# Patient Record
Sex: FEMALE | Race: WHITE | URBAN - METROPOLITAN AREA
[De-identification: names, ages, dates, MRNs, and addresses within clinical notes are randomized per-mention and may not be internally consistent; named-entity substitution may affect disease eponyms.]

---

## 2017-10-31 ENCOUNTER — APPOINTMENT (RX ONLY)
Dept: URBAN - METROPOLITAN AREA CLINIC 161 | Facility: CLINIC | Age: 42
Setting detail: DERMATOLOGY
End: 2017-10-31

## 2017-10-31 DIAGNOSIS — L71.0 PERIORAL DERMATITIS: ICD-10-CM

## 2017-10-31 PROCEDURE — ? PATIENT SPECIFIC COUNSELING

## 2017-10-31 PROCEDURE — 99202 OFFICE O/P NEW SF 15 MIN: CPT

## 2017-10-31 PROCEDURE — ? PRESCRIPTION

## 2017-10-31 PROCEDURE — ? COUNSELING

## 2017-10-31 RX ORDER — CLINDAMYCIN PHOSPHATE 10 MG/ML
1 LOTION TOPICAL BID
Qty: 1 | Refills: 5 | Status: ERX | COMMUNITY
Start: 2017-10-31

## 2017-10-31 RX ORDER — DOXYCYCLINE HYCLATE 100 MG/1
1 CAPSULE, GELATIN COATED ORAL BID
Qty: 60 | Refills: 5 | Status: ERX | COMMUNITY
Start: 2017-10-31

## 2017-10-31 RX ADMIN — CLINDAMYCIN PHOSPHATE 1: 10 LOTION TOPICAL at 00:00

## 2017-10-31 RX ADMIN — DOXYCYCLINE HYCLATE 1: 100 CAPSULE, GELATIN COATED ORAL at 00:00

## 2017-10-31 ASSESSMENT — LOCATION SIMPLE DESCRIPTION DERM
LOCATION SIMPLE: LEFT EYELID
LOCATION SIMPLE: LEFT CHEEK
LOCATION SIMPLE: UPPER LIP
LOCATION SIMPLE: RIGHT CHEEK

## 2017-10-31 ASSESSMENT — LOCATION DETAILED DESCRIPTION DERM
LOCATION DETAILED: LEFT LATERAL CANTHUS
LOCATION DETAILED: RIGHT SUPERIOR CENTRAL MALAR CHEEK
LOCATION DETAILED: RIGHT INFERIOR MEDIAL MALAR CHEEK
LOCATION DETAILED: LEFT INFERIOR MEDIAL MALAR CHEEK
LOCATION DETAILED: PHILTRUM

## 2017-10-31 ASSESSMENT — LOCATION ZONE DERM
LOCATION ZONE: FACE
LOCATION ZONE: EYELID
LOCATION ZONE: LIP

## 2017-10-31 NOTE — PROCEDURE: PATIENT SPECIFIC COUNSELING
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  I recommend treatment with doxycycline hyclate 100 mg twice a day and clindamycin lotion twice a day.  RTC if condition does not improve/clear with treatment after 1-2 months of therapy.
Detail Level: Detailed

## 2018-01-16 ENCOUNTER — APPOINTMENT (RX ONLY)
Dept: URBAN - METROPOLITAN AREA CLINIC 161 | Facility: CLINIC | Age: 43
Setting detail: DERMATOLOGY
End: 2018-01-16

## 2018-01-16 DIAGNOSIS — D22 MELANOCYTIC NEVI: ICD-10-CM

## 2018-01-16 DIAGNOSIS — L44.8 OTHER SPECIFIED PAPULOSQUAMOUS DISORDERS: ICD-10-CM | Status: RESOLVING

## 2018-01-16 PROBLEM — D23.72 OTHER BENIGN NEOPLASM OF SKIN OF LEFT LOWER LIMB, INCLUDING HIP: Status: ACTIVE | Noted: 2018-01-16

## 2018-01-16 PROBLEM — F41.9 ANXIETY DISORDER, UNSPECIFIED: Status: ACTIVE | Noted: 2018-01-16

## 2018-01-16 PROBLEM — D22.61 MELANOCYTIC NEVI OF RIGHT UPPER LIMB, INCLUDING SHOULDER: Status: ACTIVE | Noted: 2018-01-16

## 2018-01-16 PROBLEM — D23.71 OTHER BENIGN NEOPLASM OF SKIN OF RIGHT LOWER LIMB, INCLUDING HIP: Status: ACTIVE | Noted: 2018-01-16

## 2018-01-16 PROBLEM — D22.39 MELANOCYTIC NEVI OF OTHER PARTS OF FACE: Status: ACTIVE | Noted: 2018-01-16

## 2018-01-16 PROBLEM — D22.5 MELANOCYTIC NEVI OF TRUNK: Status: ACTIVE | Noted: 2018-01-16

## 2018-01-16 PROCEDURE — ? COUNSELING

## 2018-01-16 PROCEDURE — 99213 OFFICE O/P EST LOW 20 MIN: CPT

## 2018-01-16 ASSESSMENT — LOCATION SIMPLE DESCRIPTION DERM
LOCATION SIMPLE: UPPER BACK
LOCATION SIMPLE: LEFT LOWER BACK
LOCATION SIMPLE: LEFT CLAVICULAR SKIN
LOCATION SIMPLE: RIGHT CHEEK
LOCATION SIMPLE: LEFT UPPER BACK
LOCATION SIMPLE: CHEST
LOCATION SIMPLE: RIGHT UPPER ARM

## 2018-01-16 ASSESSMENT — LOCATION DETAILED DESCRIPTION DERM
LOCATION DETAILED: SUPERIOR THORACIC SPINE
LOCATION DETAILED: LEFT SUPERIOR LATERAL MIDBACK
LOCATION DETAILED: RIGHT SUPERIOR CENTRAL BUCCAL CHEEK
LOCATION DETAILED: LEFT LATERAL SUPERIOR CHEST
LOCATION DETAILED: LEFT SUPERIOR UPPER BACK
LOCATION DETAILED: RIGHT ANTERIOR DISTAL UPPER ARM
LOCATION DETAILED: LEFT CLAVICULAR SKIN

## 2018-01-16 ASSESSMENT — LOCATION ZONE DERM
LOCATION ZONE: ARM
LOCATION ZONE: TRUNK
LOCATION ZONE: FACE

## 2018-11-08 ENCOUNTER — APPOINTMENT (RX ONLY)
Dept: URBAN - METROPOLITAN AREA CLINIC 161 | Facility: CLINIC | Age: 43
Setting detail: DERMATOLOGY
End: 2018-11-08

## 2018-11-08 DIAGNOSIS — H02.72 MADAROSIS OF EYELID AND PERIOCULAR AREA: ICD-10-CM

## 2018-11-08 DIAGNOSIS — K13.0 DISEASES OF LIPS: ICD-10-CM

## 2018-11-08 DIAGNOSIS — L21.8 OTHER SEBORRHEIC DERMATITIS: ICD-10-CM

## 2018-11-08 PROBLEM — H02.721 MADAROSIS OF RIGHT UPPER EYELID AND PERIOCULAR AREA: Status: ACTIVE | Noted: 2018-11-08

## 2018-11-08 PROBLEM — H02.724 MADAROSIS OF LEFT UPPER EYELID AND PERIOCULAR AREA: Status: ACTIVE | Noted: 2018-11-08

## 2018-11-08 PROBLEM — J45.909 UNSPECIFIED ASTHMA, UNCOMPLICATED: Status: ACTIVE | Noted: 2018-11-08

## 2018-11-08 PROCEDURE — ? COUNSELING

## 2018-11-08 PROCEDURE — ? PRESCRIPTION

## 2018-11-08 PROCEDURE — 99213 OFFICE O/P EST LOW 20 MIN: CPT

## 2018-11-08 PROCEDURE — ? PATIENT SPECIFIC COUNSELING

## 2018-11-08 RX ORDER — ALCLOMETASONE DIPROPIONATE 0.5 MG/G
1 CREAM TOPICAL QD
Qty: 1 | Refills: 3 | COMMUNITY
Start: 2018-11-08

## 2018-11-08 RX ORDER — BIMATOPROST 0.3 MG/ML
1 SOLUTION/ DROPS OPHTHALMIC QHS
Qty: 1 | Refills: 6 | COMMUNITY
Start: 2018-11-08

## 2018-11-08 RX ADMIN — BIMATOPROST 1: 0.3 SOLUTION/ DROPS OPHTHALMIC at 00:00

## 2018-11-08 RX ADMIN — ALCLOMETASONE DIPROPIONATE 1: 0.5 CREAM TOPICAL at 00:00

## 2018-11-08 ASSESSMENT — LOCATION ZONE DERM
LOCATION ZONE: FACE
LOCATION ZONE: EYELID
LOCATION ZONE: LIP

## 2018-11-08 ASSESSMENT — LOCATION DETAILED DESCRIPTION DERM
LOCATION DETAILED: LEFT SUPERIOR LID MARGIN
LOCATION DETAILED: RIGHT SUPERIOR LID MARGIN
LOCATION DETAILED: LEFT LATERAL EYEBROW
LOCATION DETAILED: RIGHT INFERIOR VERMILION LIP
LOCATION DETAILED: RIGHT SUPERIOR VERMILION LIP
LOCATION DETAILED: RIGHT LATERAL EYEBROW

## 2018-11-08 ASSESSMENT — LOCATION SIMPLE DESCRIPTION DERM
LOCATION SIMPLE: RIGHT LIP
LOCATION SIMPLE: RIGHT EYEBROW
LOCATION SIMPLE: LEFT SUPERIOR EYELID
LOCATION SIMPLE: LEFT EYEBROW
LOCATION SIMPLE: RIGHT SUPERIOR EYELID

## 2019-01-17 ENCOUNTER — NEW PATIENT (OUTPATIENT)
Dept: URBAN - METROPOLITAN AREA CLINIC 44 | Facility: CLINIC | Age: 44
End: 2019-01-17
Payer: OTHER GOVERNMENT

## 2019-01-17 DIAGNOSIS — H53.2 DIPLOPIA: Primary | ICD-10-CM

## 2019-01-17 PROCEDURE — 99203 OFFICE O/P NEW LOW 30 MIN: CPT | Performed by: OPHTHALMOLOGY

## 2019-01-17 PROCEDURE — 92285 EXTERNAL OCULAR PHOTOGRAPHY: CPT | Performed by: OPHTHALMOLOGY

## 2019-01-17 ASSESSMENT — INTRAOCULAR PRESSURE
OS: 15
OD: 12

## 2019-04-18 ENCOUNTER — FOLLOW UP ESTABLISHED (OUTPATIENT)
Dept: URBAN - METROPOLITAN AREA CLINIC 44 | Facility: CLINIC | Age: 44
End: 2019-04-18
Payer: OTHER GOVERNMENT

## 2019-04-18 PROCEDURE — 92285 EXTERNAL OCULAR PHOTOGRAPHY: CPT | Performed by: OPHTHALMOLOGY

## 2019-04-18 ASSESSMENT — INTRAOCULAR PRESSURE
OS: 8
OD: 8

## 2019-07-15 ENCOUNTER — APPOINTMENT (RX ONLY)
Dept: URBAN - METROPOLITAN AREA CLINIC 173 | Facility: CLINIC | Age: 44
Setting detail: DERMATOLOGY
End: 2019-07-15

## 2019-07-15 DIAGNOSIS — L57.8 OTHER SKIN CHANGES DUE TO CHRONIC EXPOSURE TO NONIONIZING RADIATION: ICD-10-CM

## 2019-07-15 DIAGNOSIS — L98.8 OTHER SPECIFIED DISORDERS OF THE SKIN AND SUBCUTANEOUS TISSUE: ICD-10-CM

## 2019-07-15 PROCEDURE — ? PATIENT SPECIFIC COUNSELING

## 2019-07-15 PROCEDURE — 99212 OFFICE O/P EST SF 10 MIN: CPT

## 2019-07-15 PROCEDURE — ? COUNSELING

## 2019-07-15 PROCEDURE — ? PRESCRIPTION

## 2019-07-15 RX ORDER — TRETINOIN 0.5 MG/G
1 CREAM TOPICAL QD
Qty: 1 | Refills: 12 | COMMUNITY
Start: 2019-07-15

## 2019-07-15 RX ADMIN — TRETINOIN 1: 0.5 CREAM TOPICAL at 00:00

## 2019-07-15 ASSESSMENT — LOCATION SIMPLE DESCRIPTION DERM
LOCATION SIMPLE: RIGHT CHEEK
LOCATION SIMPLE: LEFT CHEEK

## 2019-07-15 ASSESSMENT — LOCATION DETAILED DESCRIPTION DERM
LOCATION DETAILED: LEFT SUPERIOR MEDIAL BUCCAL CHEEK
LOCATION DETAILED: RIGHT SUPERIOR MEDIAL BUCCAL CHEEK
LOCATION DETAILED: LEFT INFERIOR MEDIAL MALAR CHEEK
LOCATION DETAILED: RIGHT INFERIOR CENTRAL MALAR CHEEK

## 2019-07-15 ASSESSMENT — LOCATION ZONE DERM: LOCATION ZONE: FACE

## 2019-07-15 NOTE — PROCEDURE: PATIENT SPECIFIC COUNSELING
I discussed with patient the the \"furrows\" on her cheek are superficial rhytides.  Printed prescription given for tretinoin 0.05% cream along with [employer] rebate.
Detail Level: Simple

## 2020-02-25 ENCOUNTER — APPOINTMENT (RX ONLY)
Dept: URBAN - METROPOLITAN AREA CLINIC 158 | Facility: CLINIC | Age: 45
Setting detail: DERMATOLOGY
End: 2020-02-25

## 2020-02-25 DIAGNOSIS — K13.0 DISEASES OF LIPS: ICD-10-CM | Status: INADEQUATELY CONTROLLED

## 2020-02-25 PROCEDURE — ? PATIENT SPECIFIC COUNSELING

## 2020-02-25 PROCEDURE — ? EDUCATIONAL RESOURCES PROVIDED

## 2020-02-25 PROCEDURE — ? PRESCRIPTION

## 2020-02-25 PROCEDURE — ? COUNSELING

## 2020-02-25 PROCEDURE — 99213 OFFICE O/P EST LOW 20 MIN: CPT

## 2020-02-25 RX ORDER — NYSTATIN AND TRIAMCINOLONE ACETONIDE 100000; 1 [USP'U]/G; MG/G
1 OINTMENT TOPICAL TWICE A DAY
Qty: 1 | Refills: 2 | Status: ERX | COMMUNITY
Start: 2020-02-25

## 2020-02-25 RX ADMIN — NYSTATIN AND TRIAMCINOLONE ACETONIDE 1: 100000; 1 OINTMENT TOPICAL at 00:00

## 2020-02-25 ASSESSMENT — LOCATION DETAILED DESCRIPTION DERM
LOCATION DETAILED: LEFT ORAL COMMISSURE
LOCATION DETAILED: RIGHT ORAL COMMISSURE

## 2020-02-25 ASSESSMENT — LOCATION ZONE DERM: LOCATION ZONE: LIP

## 2020-02-25 ASSESSMENT — SEVERITY ASSESSMENT: SEVERITY: MILD TO MODERATE

## 2020-02-25 ASSESSMENT — LOCATION SIMPLE DESCRIPTION DERM
LOCATION SIMPLE: LEFT LIP
LOCATION SIMPLE: RIGHT LIP

## 2020-02-25 NOTE — PROCEDURE: PATIENT SPECIFIC COUNSELING
Patient instructed to apply barrier creams (such as Aquaphor ointment) and  avoid licking lips excessively.
Detail Level: Detailed

## 2021-06-08 ENCOUNTER — APPOINTMENT (RX ONLY)
Dept: URBAN - METROPOLITAN AREA CLINIC 158 | Facility: CLINIC | Age: 46
Setting detail: DERMATOLOGY
End: 2021-06-08

## 2021-06-08 DIAGNOSIS — H02.72 MADAROSIS OF EYELID AND PERIOCULAR AREA: ICD-10-CM

## 2021-06-08 PROBLEM — H02.724 MADAROSIS OF LEFT UPPER EYELID AND PERIOCULAR AREA: Status: ACTIVE | Noted: 2021-06-08

## 2021-06-08 PROBLEM — H02.721 MADAROSIS OF RIGHT UPPER EYELID AND PERIOCULAR AREA: Status: ACTIVE | Noted: 2021-06-08

## 2021-06-08 PROCEDURE — ? COUNSELING

## 2021-06-08 PROCEDURE — ? EDUCATIONAL RESOURCES PROVIDED

## 2021-06-08 PROCEDURE — ? PRESCRIPTION

## 2021-06-08 PROCEDURE — ? PATIENT SPECIFIC COUNSELING

## 2021-06-08 PROCEDURE — 99212 OFFICE O/P EST SF 10 MIN: CPT

## 2021-06-08 RX ORDER — BIMATOPROST 0.3 MG/ML
1 SOLUTION/ DROPS OPHTHALMIC QD
Qty: 1 | Refills: 12 | Status: ERX | COMMUNITY
Start: 2021-06-08

## 2021-06-08 RX ADMIN — BIMATOPROST 1: 0.3 SOLUTION/ DROPS OPHTHALMIC at 00:00

## 2021-06-08 ASSESSMENT — LOCATION SIMPLE DESCRIPTION DERM
LOCATION SIMPLE: RIGHT SUPERIOR EYELID
LOCATION SIMPLE: LEFT SUPERIOR EYELID

## 2021-06-08 ASSESSMENT — LOCATION DETAILED DESCRIPTION DERM
LOCATION DETAILED: LEFT SUPERIOR LID MARGIN
LOCATION DETAILED: RIGHT SUPERIOR LID MARGIN

## 2021-06-08 ASSESSMENT — LOCATION ZONE DERM: LOCATION ZONE: EYELID

## 2022-03-09 ENCOUNTER — APPOINTMENT (RX ONLY)
Dept: URBAN - METROPOLITAN AREA CLINIC 158 | Facility: CLINIC | Age: 47
Setting detail: DERMATOLOGY
End: 2022-03-09

## 2022-03-09 DIAGNOSIS — L30.9 DERMATITIS, UNSPECIFIED: ICD-10-CM

## 2022-03-09 DIAGNOSIS — L85.3 XEROSIS CUTIS: ICD-10-CM

## 2022-03-09 PROCEDURE — ? OTHER

## 2022-03-09 PROCEDURE — 99213 OFFICE O/P EST LOW 20 MIN: CPT

## 2022-03-09 PROCEDURE — ? PRESCRIPTION

## 2022-03-09 PROCEDURE — ? COUNSELING

## 2022-03-09 RX ORDER — HYDROCORTISONE 25 MG/G
1 OINTMENT TOPICAL TWICE A DAY
Qty: 20 | Refills: 0 | Status: ERX | COMMUNITY
Start: 2022-03-09

## 2022-03-09 RX ADMIN — HYDROCORTISONE 1: 25 OINTMENT TOPICAL at 00:00

## 2022-03-09 ASSESSMENT — LOCATION ZONE DERM: LOCATION ZONE: LIP

## 2022-03-09 ASSESSMENT — LOCATION DETAILED DESCRIPTION DERM
LOCATION DETAILED: LEFT SUPERIOR VERMILION LIP
LOCATION DETAILED: RIGHT INFERIOR VERMILION LIP

## 2022-03-09 ASSESSMENT — LOCATION SIMPLE DESCRIPTION DERM
LOCATION SIMPLE: LEFT LIP
LOCATION SIMPLE: RIGHT LIP

## 2022-03-09 NOTE — PROCEDURE: OTHER
Detail Level: Zone
Note Text (......Xxx Chief Complaint.): This diagnosis correlates with the
Render Risk Assessment In Note?: no
Other (Free Text): Recommended to d/c all lip products and only use Aquaphor
Other (Free Text): Patient denies lip licking, new cosmetics, or medications. Lips are very dry. Lesion is eczematous and erythematous.

## 2022-03-28 ENCOUNTER — OFFICE VISIT (OUTPATIENT)
Dept: URBAN - METROPOLITAN AREA CLINIC 51 | Facility: CLINIC | Age: 47
End: 2022-03-28
Payer: OTHER GOVERNMENT

## 2022-03-28 DIAGNOSIS — R73.09 OTHER ABNORMAL GLUCOSE: Primary | ICD-10-CM

## 2022-03-28 DIAGNOSIS — H52.4 PRESBYOPIA: ICD-10-CM

## 2022-03-28 DIAGNOSIS — H04.123 TEAR FILM INSUFFICIENCY OF BILATERAL LACRIMAL GLANDS: ICD-10-CM

## 2022-03-28 DIAGNOSIS — Z98.890 OTHER SPECIFIED POSTPROCEDURAL STATES: ICD-10-CM

## 2022-03-28 DIAGNOSIS — H43.313 VITREOUS MEMBRANES AND STRANDS, BILATERAL: ICD-10-CM

## 2022-03-28 PROCEDURE — 92004 COMPRE OPH EXAM NEW PT 1/>: CPT | Performed by: OPTOMETRIST

## 2022-03-28 PROCEDURE — 92134 CPTRZ OPH DX IMG PST SGM RTA: CPT | Performed by: OPTOMETRIST

## 2022-03-28 ASSESSMENT — VISUAL ACUITY
OS: 20/25
OD: 20/20

## 2022-03-28 ASSESSMENT — KERATOMETRY
OS: 39.72
OD: 39.11

## 2022-03-28 ASSESSMENT — INTRAOCULAR PRESSURE
OS: 12
OD: 10

## 2022-03-28 NOTE — IMPRESSION/PLAN
Impression: Other specified postprocedural states: Z98.890.
~done elsewhere in 2000 Plan: s/p LASIK OU. Status quo OU. Monitor.

## 2022-03-28 NOTE — IMPRESSION/PLAN
Impression: Other abnormal glucose: R73.09.
*Reports taking Metformin for POS Plan: No Background Diabetic Retinopathy, no Diabetic Macular Edema and no Neovascularization of the iris, disc, or elsewhere. Disc. ocular and systemic benefits of blood sugar control. The American Diabetes Association recommends target glycohemoglobin at 7.0% or less to minimize incidence of retinopathy as well as other systemic complications of diabetes mellitus. Send notes to PCP. Check annually.

## 2022-03-28 NOTE — IMPRESSION/PLAN
Impression: Tear film insufficiency of bilateral lacrimal glands: H04.123. *s/p LASIK OU. *incomplete blinks OU
*Pt uses Naphcon A for allergies and redness Plan: Recommend patient to use ATs QID or more OU (from a list of recommended brands of artificial tears, dispensed list for patient to review) Blinking exercises reviewed

## 2022-05-19 ENCOUNTER — APPOINTMENT (RX ONLY)
Dept: URBAN - METROPOLITAN AREA CLINIC 158 | Facility: CLINIC | Age: 47
Setting detail: DERMATOLOGY
End: 2022-05-19

## 2022-05-19 DIAGNOSIS — H02.72 MADAROSIS OF EYELID AND PERIOCULAR AREA: ICD-10-CM

## 2022-05-19 DIAGNOSIS — K13.0 DISEASES OF LIPS: ICD-10-CM

## 2022-05-19 PROBLEM — H02.721 MADAROSIS OF RIGHT UPPER EYELID AND PERIOCULAR AREA: Status: ACTIVE | Noted: 2022-05-19

## 2022-05-19 PROBLEM — H02.724 MADAROSIS OF LEFT UPPER EYELID AND PERIOCULAR AREA: Status: ACTIVE | Noted: 2022-05-19

## 2022-05-19 PROCEDURE — ? COUNSELING

## 2022-05-19 PROCEDURE — ? PRESCRIPTION

## 2022-05-19 PROCEDURE — 99213 OFFICE O/P EST LOW 20 MIN: CPT

## 2022-05-19 PROCEDURE — ? PATIENT SPECIFIC COUNSELING

## 2022-05-19 RX ORDER — EMOLLIENT COMBINATION NO.32
1 EMULSION, EXTENDED RELEASE TOPICAL QD
Qty: 225 | Refills: 12 | Status: ERX | COMMUNITY
Start: 2022-05-19

## 2022-05-19 RX ORDER — BIMATOPROST 0.3 MG/ML
1 SOLUTION/ DROPS OPHTHALMIC QD
Qty: 5 | Refills: 12

## 2022-05-19 RX ADMIN — Medication 1: at 00:00

## 2022-05-19 ASSESSMENT — LOCATION DETAILED DESCRIPTION DERM
LOCATION DETAILED: RIGHT INFERIOR VERMILION LIP
LOCATION DETAILED: RIGHT MEDIAL MALAR CHEEK
LOCATION DETAILED: LEFT INFERIOR VERMILION LIP
LOCATION DETAILED: LEFT SUPERIOR LID MARGIN
LOCATION DETAILED: LEFT MEDIAL MALAR CHEEK
LOCATION DETAILED: RIGHT SUPERIOR LID MARGIN
LOCATION DETAILED: RIGHT SUPERIOR VERMILION LIP
LOCATION DETAILED: LEFT SUPERIOR VERMILION LIP

## 2022-05-19 ASSESSMENT — LOCATION ZONE DERM
LOCATION ZONE: LIP
LOCATION ZONE: EYELID
LOCATION ZONE: FACE

## 2022-05-19 ASSESSMENT — LOCATION SIMPLE DESCRIPTION DERM
LOCATION SIMPLE: RIGHT CHEEK
LOCATION SIMPLE: LEFT LIP
LOCATION SIMPLE: RIGHT LIP
LOCATION SIMPLE: RIGHT SUPERIOR EYELID
LOCATION SIMPLE: LEFT SUPERIOR EYELID
LOCATION SIMPLE: LEFT CHEEK

## 2022-05-19 NOTE — PROCEDURE: PATIENT SPECIFIC COUNSELING
Detail Level: Detailed
Patient request refill on generic Latisse. Printed prescription given.
Detail Level: Simple
Patient ahs persistent dry skin on the face and chapped lip despite the use of Aquaphor ointment.  I recommend treatment with Epiceram emulsion.  Will send prescription to [facility] to get $20 pricing.

## 2022-10-11 ENCOUNTER — APPOINTMENT (RX ONLY)
Dept: URBAN - METROPOLITAN AREA CLINIC 158 | Facility: CLINIC | Age: 47
Setting detail: DERMATOLOGY
End: 2022-10-11

## 2022-10-11 DIAGNOSIS — L57.0 ACTINIC KERATOSIS: ICD-10-CM

## 2022-10-11 PROCEDURE — ? LIQUID NITROGEN

## 2022-10-11 PROCEDURE — 17000 DESTRUCT PREMALG LESION: CPT

## 2022-10-11 PROCEDURE — ? EDUCATIONAL RESOURCES PROVIDED

## 2022-10-11 ASSESSMENT — LOCATION ZONE DERM: LOCATION ZONE: TRUNK

## 2022-10-11 ASSESSMENT — LOCATION SIMPLE DESCRIPTION DERM: LOCATION SIMPLE: CHEST

## 2022-10-11 ASSESSMENT — LOCATION DETAILED DESCRIPTION DERM: LOCATION DETAILED: RIGHT LATERAL SUPERIOR CHEST

## 2022-10-11 NOTE — PROCEDURE: LIQUID NITROGEN
Consent: The patient's consent was obtained including but not limited to risks of crusting, scabbing, blistering, scarring, darker or lighter pigmentary change, recurrence, incomplete removal and infection.
Detail Level: Detailed
Number Of Freeze-Thaw Cycles: 1 freeze-thaw cycle
Render Note In Bullet Format When Appropriate: No
Show Applicator Variable?: Yes
Duration Of Freeze Thaw-Cycle (Seconds): 5
Post-Care Instructions: I reviewed with the patient in detail post-care instructions. Patient is to wear sunprotection, and avoid picking at any of the treated lesions. Pt may apply Vaseline to crusted or scabbing areas.  Patient made aware that some lesions may take more than one treatment to fully resolve and some lesion may recur.  Patient was instructed to return to the office if lesions are not resolved after 2 months.

## 2023-05-12 ENCOUNTER — OFFICE VISIT (OUTPATIENT)
Dept: URBAN - METROPOLITAN AREA CLINIC 51 | Facility: CLINIC | Age: 48
End: 2023-05-12
Payer: OTHER GOVERNMENT

## 2023-05-12 DIAGNOSIS — H52.4 PRESBYOPIA: ICD-10-CM

## 2023-05-12 DIAGNOSIS — H43.313 VITREOUS MEMBRANES AND STRANDS, BILATERAL: Primary | ICD-10-CM

## 2023-05-12 DIAGNOSIS — H04.123 TEAR FILM INSUFFICIENCY OF BILATERAL LACRIMAL GLANDS: ICD-10-CM

## 2023-05-12 DIAGNOSIS — H16.422 PANNUS (CORNEAL), LEFT EYE: ICD-10-CM

## 2023-05-12 DIAGNOSIS — Z98.890 OTHER SPECIFIED POSTPROCEDURAL STATES: ICD-10-CM

## 2023-05-12 PROCEDURE — 99214 OFFICE O/P EST MOD 30 MIN: CPT | Performed by: OPTOMETRIST

## 2023-05-12 PROCEDURE — 92134 CPTRZ OPH DX IMG PST SGM RTA: CPT | Performed by: OPTOMETRIST

## 2023-05-12 ASSESSMENT — KERATOMETRY
OD: 39.25
OS: 39.88

## 2023-05-12 ASSESSMENT — INTRAOCULAR PRESSURE
OD: 11
OS: 13

## 2023-05-12 ASSESSMENT — VISUAL ACUITY
OD: 20/20
OS: 20/25

## 2023-05-12 NOTE — IMPRESSION/PLAN
Impression: Tear film insufficiency of bilateral lacrimal glands: H04.123. *s/p LASIK OU. *incomplete blinks OU
*Pt uses Naphcon A for allergies and redness Plan: Patient reports longstanding history of dryness. Recommend the followin) AT's at least 2+ times per day or more 2) START gel or ointment in the evenings before going to bed - list of brands given to patient 3) Avoid direct air flow from ceiling fans

## 2023-05-12 NOTE — IMPRESSION/PLAN
Impression: Presbyopia: H52.4. Plan: Demonstrated and reviewed today's refraction with patient. Release new SRx for optimal vision update.

## 2023-05-12 NOTE — IMPRESSION/PLAN
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 Plan: Educated patient on today's findings. Discussed possible etiologies (dryness vs. trauma). Recommend AT's at least 2+ times per day or more.

## 2023-05-12 NOTE — IMPRESSION/PLAN
Impression: Other specified postprocedural states: Z98.890.
~done elsewhere in 2000 Plan: History of LASIK. Flaps clear and centered OU.

## 2024-01-30 ENCOUNTER — APPOINTMENT (RX ONLY)
Dept: URBAN - METROPOLITAN AREA CLINIC 158 | Facility: CLINIC | Age: 49
Setting detail: DERMATOLOGY
End: 2024-01-30

## 2024-01-30 DIAGNOSIS — H02.72 MADAROSIS OF EYELID AND PERIOCULAR AREA: ICD-10-CM

## 2024-01-30 DIAGNOSIS — D22 MELANOCYTIC NEVI: ICD-10-CM

## 2024-01-30 PROBLEM — D23.71 OTHER BENIGN NEOPLASM OF SKIN OF RIGHT LOWER LIMB, INCLUDING HIP: Status: ACTIVE | Noted: 2024-01-30

## 2024-01-30 PROBLEM — D22.61 MELANOCYTIC NEVI OF RIGHT UPPER LIMB, INCLUDING SHOULDER: Status: ACTIVE | Noted: 2024-01-30

## 2024-01-30 PROBLEM — D22.5 MELANOCYTIC NEVI OF TRUNK: Status: ACTIVE | Noted: 2024-01-30

## 2024-01-30 PROBLEM — D22.62 MELANOCYTIC NEVI OF LEFT UPPER LIMB, INCLUDING SHOULDER: Status: ACTIVE | Noted: 2024-01-30

## 2024-01-30 PROBLEM — H02.721 MADAROSIS OF RIGHT UPPER EYELID AND PERIOCULAR AREA: Status: ACTIVE | Noted: 2024-01-30

## 2024-01-30 PROBLEM — D22.39 MELANOCYTIC NEVI OF OTHER PARTS OF FACE: Status: ACTIVE | Noted: 2024-01-30

## 2024-01-30 PROBLEM — H02.724 MADAROSIS OF LEFT UPPER EYELID AND PERIOCULAR AREA: Status: ACTIVE | Noted: 2024-01-30

## 2024-01-30 PROCEDURE — ? PATIENT SPECIFIC COUNSELING

## 2024-01-30 PROCEDURE — ? COUNSELING

## 2024-01-30 PROCEDURE — ? EDUCATIONAL RESOURCES PROVIDED

## 2024-01-30 PROCEDURE — 99213 OFFICE O/P EST LOW 20 MIN: CPT

## 2024-01-30 PROCEDURE — ? PRESCRIPTION

## 2024-01-30 RX ORDER — BIMATOPROST 0.3 MG/ML
1 SOLUTION/ DROPS OPHTHALMIC QD
Qty: 5 | Refills: 12 | Status: ERX | COMMUNITY
Start: 2024-01-30

## 2024-01-30 RX ADMIN — BIMATOPROST 1: 0.3 SOLUTION/ DROPS OPHTHALMIC at 00:00

## 2024-01-30 ASSESSMENT — LOCATION SIMPLE DESCRIPTION DERM
LOCATION SIMPLE: LEFT SHOULDER
LOCATION SIMPLE: RIGHT SUPERIOR EYELID
LOCATION SIMPLE: LEFT SUPERIOR EYELID
LOCATION SIMPLE: ABDOMEN
LOCATION SIMPLE: RIGHT CHEEK
LOCATION SIMPLE: LEFT UPPER BACK
LOCATION SIMPLE: RIGHT UPPER ARM
LOCATION SIMPLE: UPPER BACK
LOCATION SIMPLE: LEFT CLAVICULAR SKIN
LOCATION SIMPLE: LEFT LOWER BACK
LOCATION SIMPLE: LEFT POSTERIOR UPPER ARM

## 2024-01-30 ASSESSMENT — LOCATION DETAILED DESCRIPTION DERM
LOCATION DETAILED: RIGHT SUPERIOR LID MARGIN
LOCATION DETAILED: LEFT SUPERIOR UPPER BACK
LOCATION DETAILED: LEFT ANTERIOR SHOULDER
LOCATION DETAILED: LEFT POSTERIOR SHOULDER
LOCATION DETAILED: LEFT SUPERIOR LID MARGIN
LOCATION DETAILED: RIGHT SUPERIOR CENTRAL BUCCAL CHEEK
LOCATION DETAILED: LEFT PROXIMAL POSTERIOR UPPER ARM
LOCATION DETAILED: RIGHT ANTERIOR DISTAL UPPER ARM
LOCATION DETAILED: LEFT CLAVICULAR SKIN
LOCATION DETAILED: SUPERIOR THORACIC SPINE
LOCATION DETAILED: LEFT SUPERIOR LATERAL MIDBACK
LOCATION DETAILED: RIGHT LATERAL ABDOMEN

## 2024-01-30 ASSESSMENT — LOCATION ZONE DERM
LOCATION ZONE: FACE
LOCATION ZONE: EYELID
LOCATION ZONE: ARM
LOCATION ZONE: TRUNK

## 2024-01-30 NOTE — PROCEDURE: PATIENT SPECIFIC COUNSELING
Detail Level: Detailed
Patient request refill on generic Latisse. Prescription sent electronically to [facility]. Goodrx rebate to [facility] printed for patient.

## 2024-03-04 ENCOUNTER — APPOINTMENT (RX ONLY)
Dept: URBAN - METROPOLITAN AREA CLINIC 158 | Facility: CLINIC | Age: 49
Setting detail: DERMATOLOGY
End: 2024-03-04

## 2024-03-04 DIAGNOSIS — L259 CONTACT DERMATITIS AND OTHER ECZEMA, UNSPECIFIED CAUSE: ICD-10-CM

## 2024-03-04 PROBLEM — L23.9 ALLERGIC CONTACT DERMATITIS, UNSPECIFIED CAUSE: Status: ACTIVE | Noted: 2024-03-04

## 2024-03-04 PROCEDURE — ? PRESCRIPTION

## 2024-03-04 PROCEDURE — ? COUNSELING

## 2024-03-04 PROCEDURE — 99213 OFFICE O/P EST LOW 20 MIN: CPT

## 2024-03-04 PROCEDURE — ? OTHER

## 2024-03-04 RX ORDER — CLOBETASOL PROPIONATE 0.5 MG/ML
1 SOLUTION TOPICAL BID
Qty: 50 | Refills: 1 | Status: ERX | COMMUNITY
Start: 2024-03-04

## 2024-03-04 RX ADMIN — CLOBETASOL PROPIONATE 1: 0.5 SOLUTION TOPICAL at 00:00

## 2024-03-05 NOTE — PROCEDURE: OTHER
Medication:   metFORMIN (GLUCOPHAGE) 500 MG tablet -- --  --    Sig - Route: Take 1,000 mg by mouth 2 times daily (with meals). - Oral      amLODIPine (NORVASC) 5 MG tablet -- --  --    Sig - Route: Take 5 mg by mouth daily. - Oral      Last office visit date: 1/11/24  Medication Refill Protocol Failed.  Failed criteria: see below. Sent to clinician to review.       Metformin Antihyperglycemics Refill Protocol - 12 Month Protcol [id number]/05/2024 08:26 AM   Protocol Details eGFR resulted within last 12 months -- IF CRITERIA FAILED REFER TO PROTOCOL DETAILS    eGFR greater than 59 within last 12 months looking at last value OR If eGFR is between 45-59 then has patient had a repeat eGFR resulted in past 6 months -- IF CRITERIA FAILED REFER TO PROTOCOL DETAILS    Seen by prescribing provider or same department within the last 12 months or has a future appt in 3 months - IF FAILED PLEASE LOOK AT CHART REVIEW FOR LAST VISIT AND PROCEED ACCORDINGLY    Lipid Panel resulted within last 15 months    Hgb A1c less than 8 within last 6 months looking at last value -- IF CRITERIA FAILED REFER TO PROTOCOL DETAILS    Medication (including dose and sig) on current meds list          Calcium Channel Blockers Refill Protocol - 12 Month Protocol [id number]/05/2024 08:26 AM   Protocol Details eGFR within last 12 months looking at last value    Last BP was under 140/90 or if patient has diabetes, CAD, or PVD, BP under 130/80 in past year -- IF CRITERIA FAILED REFER TO PROTOCOL DETAILS    Seen by prescribing provider or same department within the last 12 months or has a future appt in 3 months - IF FAILED PLEASE LOOK AT CHART REVIEW FOR LAST VISIT AND PROCEED ACCORDINGLY    Last recorded pulse is greater than 49    Medication (including dose and sig) on current meds list        
Other (Free Text): 03/04/2024- Secondary to hair color and keratin treatment. 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 Szymanski to initiate patch testing. In the meantime, will prescribe Clobetasol 0.05% scalp solution. Follow up as needed.
Detail Level: Zone
Note Text (......Xxx Chief Complaint.): This diagnosis correlates with the
Render Risk Assessment In Note?: no

## 2024-03-14 ENCOUNTER — APPOINTMENT (RX ONLY)
Dept: URBAN - METROPOLITAN AREA CLINIC 158 | Facility: CLINIC | Age: 49
Setting detail: DERMATOLOGY
End: 2024-03-14

## 2024-03-14 DIAGNOSIS — L259 CONTACT DERMATITIS AND OTHER ECZEMA, UNSPECIFIED CAUSE: ICD-10-CM

## 2024-03-14 PROBLEM — L30.9 DERMATITIS, UNSPECIFIED: Status: ACTIVE | Noted: 2024-03-14

## 2024-03-14 PROCEDURE — ? COUNSELING

## 2024-03-14 PROCEDURE — ? PATIENT SPECIFIC COUNSELING

## 2024-03-14 PROCEDURE — 99213 OFFICE O/P EST LOW 20 MIN: CPT

## 2024-03-14 PROCEDURE — ? DEFER

## 2024-03-14 NOTE — PROCEDURE: DEFER
Procedure To Be Performed At Next Visit: Patch testing
X Size Of Lesion In Cm (Optional): 0
Introduction Text (Please End With A Colon): :
Detail Level: Detailed

## 2024-03-14 NOTE — PROCEDURE: PATIENT SPECIFIC COUNSELING
Detail Level: Detailed
Hx:  was seen in same hair salon as usual, same stylist and same products were used (Hair color dye touch up of root, followed by Trisolla keratin treatment***.\\nDuring the keratin Tx, she had a moderately intense tingling of the crown of the scalp, different than usual mild tingling with past txs.\\n1 week later she had crusting diffusely over the scalp.\\nShe saw Jess Raul who Rxed clobetasol scalp solution and recommended further evaluation for allergic contact dermatitis.\\n\\nOther Hx: has had what sounds like both contact urticaria (immediate reaction with hives surrounding her mouth after application of a  lip plumper) and allergic contact dermatitis (delayed hypersensitivity reaction) to skin care products.\\n\\nToday we discussed the use of patch testing to detect DHS/ACD.\\nShe understands and would like to go forth with this testing.\\n\\nI suspect she will react to formaldehyde and formaldehyde releasing agents, but alternatively she may instead react to PPD as found in the hair dye. Finally, it is possible that she will react to none of these, leaving us with the most likely diagnosis of severe irritant contact dermatitis from the keratin treatment.\\n\\n\\n***\\nFrom [facility] website:\\n\\n\"True Reconstructing Keratin Treatments release the lowest amount of \\nmethylene glycol (formaldehyde) \\non the market. Also known as (PPM) parts per million. In turn, our treatments deliver a comfortable experience for the professionals and their clients!\\n\\nINGREDIENTS:\\nBrazilian Marine Algae , Hypnea Musciformis Extract, Powerful Antioxidants, Deeply moisturizing for softness, Gellidiela Acerosa Extract, Rich in amino acids to restructure hair, soothes + smooths the hair.\"

## 2024-04-01 ENCOUNTER — APPOINTMENT (RX ONLY)
Dept: URBAN - METROPOLITAN AREA CLINIC 158 | Facility: CLINIC | Age: 49
Setting detail: DERMATOLOGY
End: 2024-04-01

## 2024-04-01 DIAGNOSIS — L259 CONTACT DERMATITIS AND OTHER ECZEMA, UNSPECIFIED CAUSE: ICD-10-CM

## 2024-04-01 PROBLEM — L23.9 ALLERGIC CONTACT DERMATITIS, UNSPECIFIED CAUSE: Status: ACTIVE | Noted: 2024-04-01

## 2024-04-01 PROCEDURE — ? PATCH TESTING

## 2024-04-01 PROCEDURE — 95044 PATCH/APPLICATION TESTS: CPT

## 2024-04-01 NOTE — PROCEDURE: PATCH TESTING
Consent: Verbal consent obtained, risks reviewed including but not limited to rash, itching, allergic reaction rash.
Detail Level: Zone
Post-Care Instructions: I reviewed with the patient in detail post-care instructions. Patient should not sweat, pick at, or get the patches wet for 48 hours.
Number Of Patches (Maximum Allowable Per Dos By Cms Is 90): 80

## 2024-04-03 ENCOUNTER — APPOINTMENT (RX ONLY)
Dept: URBAN - METROPOLITAN AREA CLINIC 158 | Facility: CLINIC | Age: 49
Setting detail: DERMATOLOGY
End: 2024-04-03

## 2024-04-03 DIAGNOSIS — L259 CONTACT DERMATITIS AND OTHER ECZEMA, UNSPECIFIED CAUSE: ICD-10-CM

## 2024-04-03 PROBLEM — L23.9 ALLERGIC CONTACT DERMATITIS, UNSPECIFIED CAUSE: Status: ACTIVE | Noted: 2024-04-03

## 2024-04-03 PROCEDURE — ? NORTH AMERICAN 80 PATCH TEST READING

## 2024-04-03 NOTE — PROCEDURE: NORTH AMERICAN 80 PATCH TEST READING
Allergen 31 Reaction: no reaction
Name Of Allergen 11: Clobetasol-17-propionate
Name Of Allergen 65: Disperse Blue 106/124 Mix
Name Of Allergen 70: Benzoylperoxide
Name Of Allergen 75: Amidoamine
Name Of Allergen 50: Fragrance Mix ll
Name Of Allergen 60: Hydroxyperoxides of Limonene
Name Of Allergen 55: Hema 2-Hydroxyethyl methacrylate
Show Allergen Counseling In The Note?: No
Name Of Allergen 6: Cinnamal / (Cinnamic aldehyde)
Name Of Allergen 22: DL-alpha-tocopherol
Name Of Allergen 16: Mercapto mix
Name Of Allergen 80: Oleamidopropyl dimethylamine
Name Of Allergen 1: Benzocaine
Name Of Allergen 42: Methyl methacrylate
Name Of Allergen 37: 8-skmh-Tvxbk-4-methoxyphenol (BHA)
Name Of Allergen 32: Thimerosal (Merthiolate)
Name Of Allergen 27: Methyldibromoglutaronitrile (MDBGN)
Name Of Allergen 51: Disperse Yellow 3
Name Of Allergen 76: Cocamidopropylbetaine
Name Of Allergen 56: DMDM Hydantoin
Name Of Allergen 61: Desoximethasone
Name Of Allergen 7: Amerchol L 101
Name Of Allergen 12: Ethylenediamine dihydrochloride
Name Of Allergen 66: Compositae Mix ll
Name Of Allergen 71: Isoamyl-p-methoxycinnamate
Name Of Allergen 33: Propolis
Name Of Allergen 17: N,N- Diphenylguanidine
Name Of Allergen 28: Fragrance Mix l
Name Of Allergen 23: Bacitracin
Name Of Allergen 52: Benzyl salicylate
Name Of Allergen 47: Triethanolamine
Detail Level: Zone
Name Of Allergen 8: Carba mix
Name Of Allergen 2: 2-Mercaptobenzothiazole
Name Of Allergen 43: Cobalt(ll) chloride hexahydrate
Name Of Allergen 38: Goldsodiumthiosulfate
Name Of Allergen 67: Lidocaine
Name Of Allergen 13: Epoxy resin, Bisphenol A
Name Of Allergen 72: Lyral
Name Of Allergen 77: Formaldehyde
Name Of Allergen 57: Cananga odorata oil / (Ylang-Ylang oil)
Name Of Allergen 62: Polysorbate 80 / (Polyoxyethylenesorbitanmonooleate(Tween 80))
Name Of Allergen 18: Potassium dichromate
Name Of Allergen 39: Ethyl acrylate
Name Of Allergen 29: Glutaral / (Glutaraldehyde)
Name Of Allergen 24: Mixed dialkyl thiourea
Name Of Allergen 3: Colophonium / (Colophony)
What Reading Time Point?: 48 hour
Name Of Allergen 44: Tixocortol-21-pivalate
Name Of Allergen 4: 4-Phenylenediamine base
Name Of Allergen 34: Benzophenone-3 / (2-Hydroxy-4methoxybenzophenone)
Name Of Allergen 14: Quaternium 15, Dowicil 200 / (1-(3-chloroallyl)3,5,9-dsonkw-1-azonia-adamantane)-chloride))
Name Of Allergen 73: Ethyl hexyl Salicylate
Name Of Allergen 78: Methylisothiazoliinone+Methychloroisothiazolinone / (5-Chloro-methyl-7-mympdyxlkzse-1jvj(Jimi))
Name Of Allergen 58: Benzyl alcohol
Name Of Allergen 63: Iodoprorynyl butyl carbamate
Name Of Allergen 68: Fusidic Acid sodium salt
Name Of Allergen 53: Decyl glucoside
Name Of Allergen 9: Neomycin sulfate
Name Of Allergen 48: Textile dye mix
Name Of Allergen 30: 2-Bromo-2-nitropropane-l,3-diol (Bronopol)
Name Of Allergen 25: Disperse Orange 3
Name Of Allergen 35: Chloroxylenol (PCMX) / (4-Chloro-3.5-xylenol (PCMX))
Name Of Allergen 5: Imidazolidinyl urea (Germall 115)
Name Of Allergen 45: Budesonide
Name Of Allergen 19: Myroxylon pereirae resin / (Balsam [place])
Name Of Allergen 40: Glyceryl monothiogylcolate (GMTG)
Number Of Patches Read: 80
Name Of Allergen 74: Hydroxyperoxides of Linalool
Name Of Allergen 79: Propylene glycol
Name Of Allergen 59: Isopropyl myristate
Name Of Allergen 64: 2-n-Octyl-6-bvttqqpldalkb-4-one
Name Of Allergen 69: Disucaine hydrochloride
Name Of Allergen 49: Tea Tree Oil
Name Of Allergen 10: Thiuram mix
Name Of Allergen 54: METHYLISOTHIAZOLINONE
Name Of Allergen 15: 2-xixb-Egwsrenudlknmxcfcgltbl resin
Name Of Allergen 21: 2.5-diazolidinylurea (Jade bush)
Name Of Allergen 26: Paraben Mix
Show Negative Results In The Note?: Yes
Name Of Allergen 41: Toluenesulfaonamide formaldehyde resin (Tosylamide)
Name Of Allergen 36: Ethyleneurea, melamine formaldehyde mix
Name Of Allergen 20: Nickelsulfate hexahydrate
Name Of Allergen 46: Cocamide ISRAEL / (coconut diethanolamide)
Name Of Allergen 31: Sesquiterpene lactone mix

## 2024-04-04 ENCOUNTER — APPOINTMENT (RX ONLY)
Dept: URBAN - METROPOLITAN AREA CLINIC 158 | Facility: CLINIC | Age: 49
Setting detail: DERMATOLOGY
End: 2024-04-04

## 2024-04-04 DIAGNOSIS — L259 CONTACT DERMATITIS AND OTHER ECZEMA, UNSPECIFIED CAUSE: ICD-10-CM

## 2024-04-04 PROBLEM — L23.9 ALLERGIC CONTACT DERMATITIS, UNSPECIFIED CAUSE: Status: ACTIVE | Noted: 2024-04-04

## 2024-04-04 PROBLEM — L30.9 DERMATITIS, UNSPECIFIED: Status: ACTIVE | Noted: 2024-04-04

## 2024-04-04 PROCEDURE — ? PATIENT SPECIFIC COUNSELING

## 2024-04-04 PROCEDURE — ? DIAGNOSIS COMMENT

## 2024-04-04 PROCEDURE — ? NORTH AMERICAN 80 PATCH TEST READING

## 2024-04-04 PROCEDURE — ? COUNSELING

## 2024-04-04 PROCEDURE — 99214 OFFICE O/P EST MOD 30 MIN: CPT

## 2024-04-04 NOTE — PROCEDURE: PATIENT SPECIFIC COUNSELING
normal sinus rhythm, Normal axis, Normal IA interval and QRS complex. There are no acute ischemic ST or T-wave changes.
Detail Level: Detailed
3/14/2024 OV:\\nHx:  was seen in same hair salon as usual, same stylist and same products were used (Hair color dye touch up of root, followed by Trisolla keratin treatment***.\\nDuring the keratin Tx, she had a moderately intense tingling of the crown of the scalp, different than usual mild tingling with past txs.\\n1 week later she had crusting diffusely over the scalp.\\nShe saw Jess Carter who Rxed clobetasol scalp solution and recommended further evaluation for allergic contact dermatitis.\\n\\nOther Hx: has had what sounds like both contact urticaria (immediate reaction with hives surrounding her mouth after application of a  lip plumper) and allergic contact dermatitis (delayed hypersensitivity reaction) to skin care products.\\n\\nToday we discussed the use of patch testing to detect DHS/ACD.\\nShe understands and would like to go forth with this testing.\\n\\nI suspect she will react to formaldehyde and formaldehyde releasing agents, but alternatively she may instead react to PPD as found in the hair dye. Finally, it is possible that she will react to none of these, leaving us with the most likely diagnosis of severe irritant contact dermatitis from the keratin treatment.\\n\\n\\n***\\nFrom [facility]'s website:\\n\\n\"True Reconstructing Keratin Treatments release the lowest amount of \\nmethylene glycol (formaldehyde) \\non the market. Also known as (PPM) parts per million. In turn, our treatments deliver a comfortable experience for the professionals and their clients!\\n\\nINGREDIENTS:\\nBrazilian Marine Algae , Hypnea Musciformis Extract, Powerful Antioxidants, Deeply moisturizing for softness, Gellidiela Acerosa Extract, Rich in amino acids to restructure hair, soothes + smooths the hair.\"\\n\\n\\nUPDATE 4/4/2024:\\nPatch testing results as above.Reactions equivocal (+/-) meaning uncertain whether true allergy) except for a 1+ reaction to hydroxyperoxides of limonene.\\nPLAN:\\n1&2. retest equivocal reactions to Balsam of [place] (BOP) and Fragrance Mix, as if these are true positive (ie, she is really allergic to these), she needs to diligent in avoidance of products containing ALL FRAGRANCES. If not allergic, no particular avoidance is needed.\\n3. test again with diazolidinyl urea, which was negative on the above testing. \\nIf she is strongly allergic to FROMALDEHYDE, as found in the Trisolla product (WE DO NOT TEST WITH FORMALDEHYDE as it is not used in personal products), it would be expected that she may likely react to the allergen diazolidinyl urea, the strongest formaldehyde releasing chemical with which we test.\\n\\nAllergens placed on right upper arm (under waterproof bandaids):\\n1. BOP\\n2. Fragrance mix\\n3. diazolidinyl urea\\n\\nShe will remove these in 72 hours and read and report results next week.\\n\\nFinally:\\n- given handout on hydroxyperoxides of limonene (HOL) and will send ACDS CARD database PDF summary of safe products in light of this allergy.\\n-ALSO, will send second PDFof safe products based on allergy to HOL, PLUS the above allergens showing  equivocal reactions

## 2024-04-04 NOTE — PROCEDURE: NORTH AMERICAN 80 PATCH TEST READING
Allergen 31 Reaction: no reaction
Name Of Allergen 11: Clobetasol-17-propionate
Name Of Allergen 65: Disperse Blue 106/124 Mix
Name Of Allergen 70: Benzoylperoxide
Name Of Allergen 75: Amidoamine
Name Of Allergen 50: Fragrance Mix ll
Allergen 20 Reaction: +/-
Name Of Allergen 60: Hydroxyperoxides of Limonene
Name Of Allergen 55: Hema 2-Hydroxyethyl methacrylate
Show Allergen Counseling In The Note?: No
Name Of Allergen 6: Cinnamal / (Cinnamic aldehyde)
Name Of Allergen 22: DL-alpha-tocopherol
Name Of Allergen 16: Mercapto mix
Name Of Allergen 80: Oleamidopropyl dimethylamine
Name Of Allergen 1: Benzocaine
Name Of Allergen 42: Methyl methacrylate
Name Of Allergen 37: 1-bjmn-Jsdze-4-methoxyphenol (BHA)
Name Of Allergen 32: Thimerosal (Merthiolate)
Allergen 70 Reaction: irritant reaction
Allergen 60 Reaction: 1+
Name Of Allergen 27: Methyldibromoglutaronitrile (MDBGN)
Name Of Allergen 51: Disperse Yellow 3
Name Of Allergen 76: Cocamidopropylbetaine
Name Of Allergen 56: DMDM Hydantoin
Name Of Allergen 61: Desoximethasone
Name Of Allergen 7: Amerchol L 101
Name Of Allergen 12: Ethylenediamine dihydrochloride
Name Of Allergen 66: Compositae Mix ll
Name Of Allergen 71: Isoamyl-p-methoxycinnamate
Name Of Allergen 33: Propolis
Name Of Allergen 17: N,N- Diphenylguanidine
Name Of Allergen 28: Fragrance Mix l
Name Of Allergen 23: Bacitracin
Name Of Allergen 52: Benzyl salicylate
Name Of Allergen 47: Triethanolamine
Detail Level: Zone
Name Of Allergen 8: Carba mix
Name Of Allergen 2: 2-Mercaptobenzothiazole
Name Of Allergen 43: Cobalt(ll) chloride hexahydrate
Name Of Allergen 38: Goldsodiumthiosulfate
Name Of Allergen 67: Lidocaine
Name Of Allergen 13: Epoxy resin, Bisphenol A
Name Of Allergen 72: Lyral
Name Of Allergen 77: Formaldehyde
Name Of Allergen 57: Cananga odorata oil / (Ylang-Ylang oil)
Name Of Allergen 62: Polysorbate 80 / (Polyoxyethylenesorbitanmonooleate(Tween 80))
Name Of Allergen 18: Potassium dichromate
Name Of Allergen 39: Ethyl acrylate
Name Of Allergen 29: Glutaral / (Glutaraldehyde)
Name Of Allergen 24: Mixed dialkyl thiourea
Name Of Allergen 3: Colophonium / (Colophony)
What Reading Time Point?: 48 hour
Name Of Allergen 44: Tixocortol-21-pivalate
Name Of Allergen 4: 4-Phenylenediamine base
Name Of Allergen 34: Benzophenone-3 / (2-Hydroxy-4methoxybenzophenone)
Name Of Allergen 14: Quaternium 15, Dowicil 200 / (1-(3-chloroallyl)3,5,3-odxrzj-3-azonia-adamantane)-chloride))
Name Of Allergen 73: Ethyl hexyl Salicylate
Name Of Allergen 78: Methylisothiazoliinone+Methychloroisothiazolinone / (5-Chloro-methyl-7-shqkfiydwnvs-6goq(Jimi))
Name Of Allergen 58: Benzyl alcohol
Name Of Allergen 63: Iodoprorynyl butyl carbamate
Name Of Allergen 68: Fusidic Acid sodium salt
Name Of Allergen 53: Decyl glucoside
Name Of Allergen 9: Neomycin sulfate
Name Of Allergen 48: Textile dye mix
Name Of Allergen 30: 2-Bromo-2-nitropropane-l,3-diol (Bronopol)
Name Of Allergen 25: Disperse Orange 3
Name Of Allergen 35: Chloroxylenol (PCMX) / (4-Chloro-3.5-xylenol (PCMX))
Name Of Allergen 5: Imidazolidinyl urea (Germall 115)
Name Of Allergen 45: Budesonide
Name Of Allergen 19: Myroxylon pereirae resin / (Balsam [place])
Name Of Allergen 40: Glyceryl monothiogylcolate (GMTG)
Number Of Patches Read: 80
Name Of Allergen 74: Hydroxyperoxides of Linalool
Name Of Allergen 79: Propylene glycol
Name Of Allergen 59: Isopropyl myristate
Name Of Allergen 64: 2-n-Octyl-0-shzxlqtxomkfm-8-one
Name Of Allergen 69: Disucaine hydrochloride
Name Of Allergen 49: Tea Tree Oil
Name Of Allergen 10: Thiuram mix
Name Of Allergen 54: METHYLISOTHIAZOLINONE
Name Of Allergen 15: 9-ymjq-Cmrymeanfvcbvjyqlssofd resin
Name Of Allergen 21: 2.5-diazolidinylurea (Jade bush)
Name Of Allergen 26: Paraben Mix
Show Negative Results In The Note?: Yes
Name Of Allergen 41: Toluenesulfaonamide formaldehyde resin (Tosylamide)
Name Of Allergen 36: Ethyleneurea, melamine formaldehyde mix
Name Of Allergen 20: Nickelsulfate hexahydrate
Name Of Allergen 46: Cocamide ISRAEL / (coconut diethanolamide)
Name Of Allergen 31: Sesquiterpene lactone mix

## 2024-04-04 NOTE — PROCEDURE: DIAGNOSIS COMMENT
Detail Level: Detailed
Render Risk Assessment In Note?: no
Comment: if diazolidinyl urea (DU)is positive, this suggests she had an allergic contact dermatitis to the hair straightening product.\\nCONVERSELY, and more likely, if DU is negative, this suggest she had a severe irritant contact dermatitis to the hair straightening product.\\n\\nnote: \\n#1.\\n[facility] True Reconstructing Keratin Treatments ingredient list, as pasted above, does not appear to be complete.\\n#2.\\Kraig if the formaldehyde releasing agent (DU) is negative, she should be patch tested by the  prior to ever receiving this product again. The  likely outlines this practice for stylists